# Patient Record
Sex: FEMALE | Race: WHITE | NOT HISPANIC OR LATINO | URBAN - METROPOLITAN AREA
[De-identification: names, ages, dates, MRNs, and addresses within clinical notes are randomized per-mention and may not be internally consistent; named-entity substitution may affect disease eponyms.]

---

## 2019-09-01 ENCOUNTER — EMERGENCY (EMERGENCY)
Facility: HOSPITAL | Age: 84
LOS: 1 days | Discharge: HOME | End: 2019-09-01
Attending: EMERGENCY MEDICINE | Admitting: EMERGENCY MEDICINE
Payer: MEDICARE

## 2019-09-01 VITALS
OXYGEN SATURATION: 98 % | TEMPERATURE: 98 F | WEIGHT: 104.94 LBS | HEART RATE: 81 BPM | DIASTOLIC BLOOD PRESSURE: 76 MMHG | HEIGHT: 64 IN | SYSTOLIC BLOOD PRESSURE: 195 MMHG | RESPIRATION RATE: 16 BRPM

## 2019-09-01 VITALS
TEMPERATURE: 98 F | RESPIRATION RATE: 16 BRPM | HEART RATE: 71 BPM | SYSTOLIC BLOOD PRESSURE: 110 MMHG | OXYGEN SATURATION: 99 % | DIASTOLIC BLOOD PRESSURE: 73 MMHG

## 2019-09-01 PROCEDURE — 99284 EMERGENCY DEPT VISIT MOD MDM: CPT | Mod: 25

## 2019-09-01 PROCEDURE — 71046 X-RAY EXAM CHEST 2 VIEWS: CPT

## 2019-09-01 PROCEDURE — 76700 US EXAM ABDOM COMPLETE: CPT | Mod: 26

## 2019-09-01 PROCEDURE — 93010 ELECTROCARDIOGRAM REPORT: CPT

## 2019-09-01 PROCEDURE — 76700 US EXAM ABDOM COMPLETE: CPT

## 2019-09-01 PROCEDURE — 99284 EMERGENCY DEPT VISIT MOD MDM: CPT

## 2019-09-01 PROCEDURE — 71046 X-RAY EXAM CHEST 2 VIEWS: CPT | Mod: 26

## 2019-09-01 PROCEDURE — 93005 ELECTROCARDIOGRAM TRACING: CPT

## 2019-09-01 RX ORDER — ONDANSETRON 8 MG/1
4 TABLET, FILM COATED ORAL ONCE
Refills: 0 | Status: COMPLETED | OUTPATIENT
Start: 2019-09-01 | End: 2019-09-01

## 2019-09-01 RX ADMIN — ONDANSETRON 4 MILLIGRAM(S): 8 TABLET, FILM COATED ORAL at 19:41

## 2019-09-01 NOTE — ED PROVIDER NOTE - MUSCULOSKELETAL, MLM
Spine appears normal, range of motion is not limited, no muscle or joint tenderness diffuse muscle atrophy

## 2019-09-01 NOTE — ED PROVIDER NOTE - NSFOLLOWUPINSTRUCTIONS_ED_ALL_ED_FT
SOFT FOODS  EAT WITH A MONITOR  SPEECH AND SWALLOW STUDIES AS OUT PT   FOLLOW UP WITH EAR NOSE AND THROAT DR SHAH  RETURN FOR FEVER CHILLS CHEST PAIN SHORTNESS OF BREATH  FOLLOW UP WITH YOUR PRIMARY CARE DOCTOR

## 2019-09-01 NOTE — ED PROVIDER NOTE - EYES, MLM
Clear bilaterally, pupils equal, round and reactive to light. per pt eye lids are retracted abnormally

## 2019-09-01 NOTE — ED PROVIDER NOTE - OBJECTIVE STATEMENT
pt is an 88 yo f who is resident of [facility].  she was eating dinner and began to choke on a piece of steak.  according to transfer note she had an episode of cyanosis of lips no loc pt feels better as the food was dislodged after the hemich was done.  no cp no sob no abd pain.  hx of parkinsons disease on sinemet sp knee surgery pmd dr Bibiana thompson at [facility] never a smoker  bib ems for eval

## 2019-09-01 NOTE — ED PROVIDER NOTE - PATIENT PORTAL LINK FT
You can access the FollowMyHealth Patient Portal offered by [facility] by registering at the following website: [email or web address]. By joining [employer]’s FollowMyHealth portal, you will also be able to view your health information using other applications (apps) compatible with our system.

## 2019-09-01 NOTE — ED ADULT NURSE NOTE - CHPI ED NUR SYMPTOMS NEG
no fever/no tingling/no dizziness/no chills/no decreased eating/drinking/no vomiting/no weakness/no pain

## 2019-09-01 NOTE — ED PROVIDER NOTE - CONSTITUTIONAL, MLM
normal... chronically ill w f thin with parkinsonian tremor, well nourished, awake, alert, oriented to person, place, time/situation and in no apparent distress.

## 2019-09-01 NOTE — ED ADULT NURSE NOTE - OBJECTIVE STATEMENT
87 year old female brought in by EMS from [facility] post choking on a piece of steak. Patient was eating at the facility, began choking and presented with cyanotic lips/face. The Heimlich was performed on the patient and the food was dislodged. Patient denies loss of consciousness. Patient reports immediate relief once the Heimlich was completed. On arrival to the ED patient only complains of nausea without vomiting. Patient is at her baseline mental status. Patient otherwise feels her normal self. Patient is without cyanosis to the face or mouth or lips. Patient denies shortness of breath or difficulty breathing. No respiratory distress noted. Patient denies pain. Denies chest or abdominal pain. Patient with history of Parkinson's, mild tremor noted.

## 2019-09-21 ENCOUNTER — INPATIENT (INPATIENT)
Facility: HOSPITAL | Age: 84
LOS: 2 days | Discharge: HOME | DRG: 392 | End: 2019-09-24
Attending: INTERNAL MEDICINE | Admitting: HOSPITALIST
Payer: MEDICARE

## 2019-09-21 VITALS
DIASTOLIC BLOOD PRESSURE: 84 MMHG | TEMPERATURE: 98 F | SYSTOLIC BLOOD PRESSURE: 118 MMHG | RESPIRATION RATE: 16 BRPM | WEIGHT: 95.02 LBS | OXYGEN SATURATION: 99 % | HEIGHT: 60 IN | HEART RATE: 74 BPM

## 2019-09-21 DIAGNOSIS — K52.9 NONINFECTIVE GASTROENTERITIS AND COLITIS, UNSPECIFIED: ICD-10-CM

## 2019-09-21 DIAGNOSIS — R33.9 RETENTION OF URINE, UNSPECIFIED: ICD-10-CM

## 2019-09-21 DIAGNOSIS — E78.5 HYPERLIPIDEMIA, UNSPECIFIED: ICD-10-CM

## 2019-09-21 DIAGNOSIS — G20 PARKINSON'S DISEASE: ICD-10-CM

## 2019-09-21 DIAGNOSIS — Z29.9 ENCOUNTER FOR PROPHYLACTIC MEASURES, UNSPECIFIED: ICD-10-CM

## 2019-09-21 LAB
ALBUMIN SERPL ELPH-MCNC: 3.9 G/DL — SIGNIFICANT CHANGE UP (ref 3.3–5)
ALP SERPL-CCNC: 96 U/L — SIGNIFICANT CHANGE UP (ref 40–120)
ALT FLD-CCNC: <5 U/L — LOW (ref 10–45)
ANION GAP SERPL CALC-SCNC: 9 MMOL/L — SIGNIFICANT CHANGE UP (ref 5–17)
APPEARANCE UR: CLEAR — SIGNIFICANT CHANGE UP
APTT BLD: 32.3 SEC — SIGNIFICANT CHANGE UP (ref 27.5–36.3)
AST SERPL-CCNC: 11 U/L — SIGNIFICANT CHANGE UP (ref 10–40)
BACTERIA # UR AUTO: NEGATIVE — SIGNIFICANT CHANGE UP
BASOPHILS # BLD AUTO: 0 K/UL — SIGNIFICANT CHANGE UP (ref 0–0.2)
BASOPHILS NFR BLD AUTO: 0.2 % — SIGNIFICANT CHANGE UP (ref 0–2)
BILIRUB SERPL-MCNC: 0.4 MG/DL — SIGNIFICANT CHANGE UP (ref 0.2–1.2)
BILIRUB UR-MCNC: NEGATIVE — SIGNIFICANT CHANGE UP
BUN SERPL-MCNC: 26 MG/DL — HIGH (ref 7–23)
CALCIUM SERPL-MCNC: 10 MG/DL — SIGNIFICANT CHANGE UP (ref 8.4–10.5)
CHLORIDE SERPL-SCNC: 101 MMOL/L — SIGNIFICANT CHANGE UP (ref 96–108)
CO2 SERPL-SCNC: 30 MMOL/L — SIGNIFICANT CHANGE UP (ref 22–31)
COLOR SPEC: YELLOW — SIGNIFICANT CHANGE UP
CREAT SERPL-MCNC: 0.5 MG/DL — SIGNIFICANT CHANGE UP (ref 0.5–1.3)
DIFF PNL FLD: NEGATIVE — SIGNIFICANT CHANGE UP
EOSINOPHIL # BLD AUTO: 0 K/UL — SIGNIFICANT CHANGE UP (ref 0–0.5)
EOSINOPHIL NFR BLD AUTO: 0.3 % — SIGNIFICANT CHANGE UP (ref 0–6)
EPI CELLS # UR: 1 /HPF — SIGNIFICANT CHANGE UP
GLUCOSE SERPL-MCNC: 108 MG/DL — HIGH (ref 70–99)
GLUCOSE UR QL: NEGATIVE — SIGNIFICANT CHANGE UP
HCT VFR BLD CALC: 39.8 % — SIGNIFICANT CHANGE UP (ref 34.5–45)
HGB BLD-MCNC: 12.4 G/DL — SIGNIFICANT CHANGE UP (ref 11.5–15.5)
HYALINE CASTS # UR AUTO: 2 /LPF — SIGNIFICANT CHANGE UP (ref 0–2)
INR BLD: 1.08 RATIO — SIGNIFICANT CHANGE UP (ref 0.88–1.16)
KETONES UR-MCNC: SIGNIFICANT CHANGE UP
LEUKOCYTE ESTERASE UR-ACNC: NEGATIVE — SIGNIFICANT CHANGE UP
LYMPHOCYTES # BLD AUTO: 0.8 K/UL — LOW (ref 1–3.3)
LYMPHOCYTES # BLD AUTO: 6.2 % — LOW (ref 13–44)
MCHC RBC-ENTMCNC: 27.1 PG — SIGNIFICANT CHANGE UP (ref 27–34)
MCHC RBC-ENTMCNC: 31.1 GM/DL — LOW (ref 32–36)
MCV RBC AUTO: 87.4 FL — SIGNIFICANT CHANGE UP (ref 80–100)
MONOCYTES # BLD AUTO: 0.5 K/UL — SIGNIFICANT CHANGE UP (ref 0–0.9)
MONOCYTES NFR BLD AUTO: 4.4 % — SIGNIFICANT CHANGE UP (ref 2–14)
NEUTROPHILS # BLD AUTO: 11.1 K/UL — HIGH (ref 1.8–7.4)
NEUTROPHILS NFR BLD AUTO: 88.9 % — HIGH (ref 43–77)
NITRITE UR-MCNC: NEGATIVE — SIGNIFICANT CHANGE UP
PH UR: 6.5 — SIGNIFICANT CHANGE UP (ref 5–8)
PLATELET # BLD AUTO: 329 K/UL — SIGNIFICANT CHANGE UP (ref 150–400)
POTASSIUM SERPL-MCNC: 4 MMOL/L — SIGNIFICANT CHANGE UP (ref 3.5–5.3)
POTASSIUM SERPL-SCNC: 4 MMOL/L — SIGNIFICANT CHANGE UP (ref 3.5–5.3)
PROT SERPL-MCNC: 6.1 G/DL — SIGNIFICANT CHANGE UP (ref 6–8.3)
PROT UR-MCNC: ABNORMAL
PROTHROM AB SERPL-ACNC: 12.3 SEC — SIGNIFICANT CHANGE UP (ref 10–12.9)
RBC # BLD: 4.55 M/UL — SIGNIFICANT CHANGE UP (ref 3.8–5.2)
RBC # FLD: 13.5 % — SIGNIFICANT CHANGE UP (ref 10.3–14.5)
RBC CASTS # UR COMP ASSIST: 2 /HPF — SIGNIFICANT CHANGE UP (ref 0–4)
SODIUM SERPL-SCNC: 140 MMOL/L — SIGNIFICANT CHANGE UP (ref 135–145)
SP GR SPEC: 1.02 — SIGNIFICANT CHANGE UP (ref 1.01–1.02)
UROBILINOGEN FLD QL: NEGATIVE — SIGNIFICANT CHANGE UP
WBC # BLD: 12.5 K/UL — HIGH (ref 3.8–10.5)
WBC # FLD AUTO: 12.5 K/UL — HIGH (ref 3.8–10.5)
WBC UR QL: 1 /HPF — SIGNIFICANT CHANGE UP (ref 0–5)

## 2019-09-21 PROCEDURE — 99285 EMERGENCY DEPT VISIT HI MDM: CPT

## 2019-09-21 PROCEDURE — 74177 CT ABD & PELVIS W/CONTRAST: CPT | Mod: 26

## 2019-09-21 PROCEDURE — 99223 1ST HOSP IP/OBS HIGH 75: CPT | Mod: GC

## 2019-09-21 RX ORDER — CARBIDOPA AND LEVODOPA 25; 100 MG/1; MG/1
1 TABLET ORAL ONCE
Refills: 0 | Status: COMPLETED | OUTPATIENT
Start: 2019-09-21 | End: 2019-09-21

## 2019-09-21 RX ORDER — ACETAMINOPHEN 500 MG
975 TABLET ORAL ONCE
Refills: 0 | Status: COMPLETED | OUTPATIENT
Start: 2019-09-21 | End: 2019-09-21

## 2019-09-21 RX ORDER — METRONIDAZOLE 500 MG
500 TABLET ORAL ONCE
Refills: 0 | Status: COMPLETED | OUTPATIENT
Start: 2019-09-21 | End: 2019-09-21

## 2019-09-21 RX ORDER — MIRTAZAPINE 45 MG/1
15 TABLET, ORALLY DISINTEGRATING ORAL ONCE
Refills: 0 | Status: COMPLETED | OUTPATIENT
Start: 2019-09-21 | End: 2019-09-21

## 2019-09-21 RX ORDER — SENNA PLUS 8.6 MG/1
2 TABLET ORAL AT BEDTIME
Refills: 0 | Status: DISCONTINUED | OUTPATIENT
Start: 2019-09-21 | End: 2019-09-22

## 2019-09-21 RX ORDER — SODIUM CHLORIDE 9 MG/ML
1000 INJECTION INTRAMUSCULAR; INTRAVENOUS; SUBCUTANEOUS ONCE
Refills: 0 | Status: COMPLETED | OUTPATIENT
Start: 2019-09-21 | End: 2019-09-21

## 2019-09-21 RX ORDER — DOCUSATE SODIUM 100 MG
100 CAPSULE ORAL THREE TIMES A DAY
Refills: 0 | Status: DISCONTINUED | OUTPATIENT
Start: 2019-09-21 | End: 2019-09-22

## 2019-09-21 RX ORDER — CARBIDOPA AND LEVODOPA 25; 100 MG/1; MG/1
1 TABLET ORAL ONCE
Refills: 0 | Status: DISCONTINUED | OUTPATIENT
Start: 2019-09-21 | End: 2019-09-21

## 2019-09-21 RX ORDER — CIPROFLOXACIN LACTATE 400MG/40ML
400 VIAL (ML) INTRAVENOUS ONCE
Refills: 0 | Status: COMPLETED | OUTPATIENT
Start: 2019-09-21 | End: 2019-09-21

## 2019-09-21 RX ADMIN — CARBIDOPA AND LEVODOPA 1 TABLET(S): 25; 100 TABLET ORAL at 17:37

## 2019-09-21 RX ADMIN — CARBIDOPA AND LEVODOPA 1 TABLET(S): 25; 100 TABLET ORAL at 17:38

## 2019-09-21 RX ADMIN — CARBIDOPA AND LEVODOPA 1 TABLET(S): 25; 100 TABLET ORAL at 22:15

## 2019-09-21 RX ADMIN — Medication 200 MILLIGRAM(S): at 21:06

## 2019-09-21 RX ADMIN — SODIUM CHLORIDE 1000 MILLILITER(S): 9 INJECTION INTRAMUSCULAR; INTRAVENOUS; SUBCUTANEOUS at 21:06

## 2019-09-21 RX ADMIN — MIRTAZAPINE 15 MILLIGRAM(S): 45 TABLET, ORALLY DISINTEGRATING ORAL at 22:15

## 2019-09-21 RX ADMIN — Medication 1 ENEMA: at 16:12

## 2019-09-21 RX ADMIN — Medication 975 MILLIGRAM(S): at 21:10

## 2019-09-21 RX ADMIN — Medication 100 MILLIGRAM(S): at 22:16

## 2019-09-21 NOTE — H&P ADULT - HISTORY OF PRESENT ILLNESS
Pt is a 86 yo F w/ Parkinson's disease, HLD p/w constipation for about 6 days. Pt reported that she has been unable to "defecate" for about 6 day. Pt reported that on the day of admission she additionally had trouble urinating and had pain in her anus which she described  though she josh as though "i have to go, but can't." Pt reported that she did not experience and fevers, chills, N/V, SOB, CP, abdominal pain, melena, hematochezia.     In the ED,  T(C): 36.4 (21 Sep 2019 19:31), Max: 36.6 (21 Sep 2019 14:01)  T(F): 97.5 (21 Sep 2019 19:31), Max: 97.9 (21 Sep 2019 14:01)  HR: 77 (21 Sep 2019 21:42) (74 - 87)  BP: 124/78 (21 Sep 2019 21:42) (118/84 - 150/118)  RR: 16 (21 Sep 2019 21:42) (16 - 18)  SpO2: 98% (21 Sep 2019 21:42) (97% - 99%)    WBC: 12.5     CT A/P: Moderate colonic fecal load including a large amount of stool within the   rectum which demonstrates wall thickening and surrounding inflammatory   change, suggestive of stercoral colitis. No evidence of small bowel obstruction.  Small nonspecific pelvic and perihepatic free fluid. Mild periportal edema, nonspecific.    Pt was treated w/ Carbidopa/levodopa 25/100mg PO x1, carbidopa/levodopa CR 25/100 PO x1, Cipro/Flagyl, mirtazapine 15mg PO x1, Fleet Enema x1, and 1L NS bolus x1.

## 2019-09-21 NOTE — ED PROVIDER NOTE - PROGRESS NOTE DETAILS
Patient takes both Sinemet 25/100 and Sinemet ER 25/100 at 830am, 1130am, 230pm, 600pm, 900pm. Patient also takes Remeron 15mg at 900pm. - Mariama Wakefield PA-C [phone] Daughter's phone number for further contact. - PALMA MccoyC Pt complaining of inability to urinate, saying she is pushing but nothing is coming out, suprapubic area flat to percussion and tender, can palpate the bladder and it is enlarged, will place Aleman for comfort and possible retention.  Hafsa

## 2019-09-21 NOTE — ED PROVIDER NOTE - PHYSICAL EXAMINATION
GEN: Well Appearing, Nontoxic, NAD  HEENT: NC/AT, Symm Facies. EOMI  CV:  +S1S2, RRR w/o m/g/r  RESP: CTAB w/o w/r/r  ABD: Soft, nt/nd, +BS. No guarding/rebound.  EXT/MSK: No lower extremity edema or calf tenderness.  FROMx4  SKIN: No erythema, lesions or rash  Neuro: Grossly intact, AOX3 with normal speech, Sensation intact, motor equal GEN: Well Appearing, Nontoxic, NAD  HEENT: NC/AT, Symm Facies. EOMI  CV:  +S1S2, RRR w/o m/g/r  RESP: CTAB w/o w/r/r  ABD: Soft, nt/nd, +BS. No guarding/rebound.  EXT/MSK: No lower extremity edema or calf tenderness.  FROMx4  SKIN: No erythema, lesions or rash  Neuro: Grossly intact, AOX3 with normal speech, Sensation intact, motor equal  Rectal: Loose stool felt, no hard masses palpated, non tender

## 2019-09-21 NOTE — H&P ADULT - PROBLEM SELECTOR PLAN 2
Pt c/o dysuria x1 day. Likely in the setting chronic constipation.   - Management as above  - Strict I/Os  - Monitor for Pt c/o dysuria x1 day in setting of large stool burden.   - Management as above  - Strict I/Os  - Will obtain PVR. If patient is retaining urine will place mayo. Pt c/o dysuria x1 day in setting of large stool burden.   - Management as above  - Strict I/Os  - Will obtain PVR. If patient is retaining urine, will place mayo.

## 2019-09-21 NOTE — H&P ADULT - PROBLEM SELECTOR PLAN 5
DVT: SCDs  Diet: Regular  Code: Full  Dispo: Back to assisted living facility. Home carbidopa-levodopa needs to be clarified. Documentation from assisted living facility reported sinemet and sinemet CR dosing frequency as 5 times a day.

## 2019-09-21 NOTE — ED PROVIDER NOTE - NS ED ROS FT
Constitutional: No fever or chills  Eyes: No visual changes  CV: No chest pain or lower extremity edema  Resp: No SOB no cough  GI: No abd pain. No nausea or vomiting. +constipation.   : +unable to urinate since AM, No dysuria, hematuria.   MSK: No musculoskeletal pain  Skin: No rash  Psych: No complaints   Neuro: No headache. No numbness or tingling. No weakness.

## 2019-09-21 NOTE — ED PROVIDER NOTE - OBJECTIVE STATEMENT
86 y/o female with PMH Parkinson's presents to ED from [facility] with 5 days without BM, although patient states that she passed two small, hard pieces of stool about 1 hour ago. Patient states that she has not been able to urinate today, last time was last night. States she has been tolerating PO intake, +Flatus.   Denies abdominal pain, SOB, chest pain, nausea, vomiting, fever, hematuria, dysuria, increased urinary frequency.   States that she drank her "ballerina tea" which has helped her in the past defecate although has not worked.

## 2019-09-21 NOTE — H&P ADULT - PROBLEM SELECTOR PLAN 4
- C/w home Carbidopa-Levodopa DVT: SCDs  Diet: Regular  Code: Full  Dispo: Back to assisted living facility.

## 2019-09-21 NOTE — ED PROVIDER NOTE - ATTENDING CONTRIBUTION TO CARE
parkinsons , 5 days constip / no pain  non-tender abdomen  pa exam w some stool in vault -not impacted - removed  enema / Symptomatic treatment. consider ct.  pt wo bm / ct ordered  ct shows colitis / will admit.

## 2019-09-21 NOTE — ED ADULT NURSE NOTE - NSIMPLEMENTINTERV_GEN_ALL_ED
Implemented All Fall Risk Interventions:  [place] to call system. Call bell, personal items and telephone within reach. Instruct patient to call for assistance. Room bathroom lighting operational. Non-slip footwear when patient is off stretcher. Physically safe environment: no spills, clutter or unnecessary equipment. Stretcher in lowest position, wheels locked, appropriate side rails in place. Provide visual cue, wrist band, yellow gown, etc. Monitor gait and stability. Monitor for mental status changes and reorient to person, place, and time. Review medications for side effects contributing to fall risk. Reinforce activity limits and safety measures with patient and family.

## 2019-09-21 NOTE — ED ADULT NURSE NOTE - OBJECTIVE STATEMENT
86 y/o female PMH Parkinson's disease presents to ED reporting abdominal pain via EMS. EMS reports pt has not had a bowel movement in 5 days, most recently urinated last night and 88 y/o female PMH Parkinson's disease presents to ED reporting abdominal pain via EMS. EMS reports pt has not had a bowel movement in 5 days, most recently urinated last night and has not urinated since. On exam, AOx3, speaking in complete sentences. Lung sounds CTA, NAD. Abdomen soft, non-tender, mildly distended. Pt denies CP, SOB, n/v/d, fever/chills at this time. Seen and evaluated by PA. Jr placed, labs sent.

## 2019-09-21 NOTE — H&P ADULT - NSHPPHYSICALEXAM_GEN_ALL_CORE
T(C): 36.4 (09-21-19 @ 19:31), Max: 36.6 (09-21-19 @ 14:01)  HR: 77 (09-21-19 @ 21:42) (74 - 87)  BP: 124/78 (09-21-19 @ 21:42) (118/84 - 150/118)  RR: 16 (09-21-19 @ 21:42) (16 - 18)  SpO2: 98% (09-21-19 @ 21:42) (97% - 99%)    GENERAL APPEARANCE: NAD; frail.   HEENT:  PERRL, EOMI. External auditory canals normal, hearing grossly intact.  NECK: Neck supple, non-tender without lymphadenopathy, masses or thyromegaly.  CARDIAC: Normal S1 and S2. No S3, S4 or murmurs. Rhythm is regular.  LUNGS: mild bibasilar crackles.   ABDOMEN: Positive bowel sounds. Soft, nondistended, nontender. No guarding or rebound.   EXTREMITIES: No significant deformity or joint abnormality. No edema. Peripheral pulses intact.   NEUROLOGICAL: CN II-XII intact. Strength and sensation symmetric and intact throughout. Course resting tremor  SKIN: Skin normal color, texture and turgor with no lesions or eruptions.  PSYCHIATRIC: AOx3.Normal affect and behavior. T(C): 36.4 (09-21-19 @ 19:31), Max: 36.6 (09-21-19 @ 14:01)  HR: 77 (09-21-19 @ 21:42) (74 - 87)  BP: 124/78 (09-21-19 @ 21:42) (118/84 - 150/118)  RR: 16 (09-21-19 @ 21:42) (16 - 18)  SpO2: 98% (09-21-19 @ 21:42) (97% - 99%)    GENERAL APPEARANCE: NAD; frail.   HEENT:  PERRL, EOMI. External auditory canals normal, hearing grossly intact.  NECK: Neck supple, non-tender without lymphadenopathy, masses or thyromegaly.  CARDIAC: Normal S1 and S2. No S3, S4 or murmurs. Rhythm is regular.  LUNGS: mild bibasilar crackles.   ABDOMEN: Positive bowel sounds. Soft, nondistended, nontender. No guarding or rebound.   EXTREMITIES: No significant deformity or joint abnormality. No edema. Peripheral pulses intact.   Rectal: Soiled w/ moderate amount of stool. External hemorrhoids; rectal vault filled w/ soft stool.   NEUROLOGICAL: CN II-XII intact. Strength and sensation symmetric and intact throughout. Course resting tremor  SKIN: Skin normal color, texture and turgor with no lesions or eruptions.  PSYCHIATRIC: AOx3.Normal affect and behavior. T(C): 36.4 (09-21-19 @ 19:31), Max: 36.6 (09-21-19 @ 14:01)  HR: 77 (09-21-19 @ 21:42) (74 - 87)  BP: 124/78 (09-21-19 @ 21:42) (118/84 - 150/118)  RR: 16 (09-21-19 @ 21:42) (16 - 18)  SpO2: 98% (09-21-19 @ 21:42) (97% - 99%)    GENERAL APPEARANCE: NAD; frail.   HEENT:  PERRL, EOMI. External auditory canals normal, hearing grossly intact.  NECK: Neck supple, non-tender without lymphadenopathy, masses or thyromegaly.  Heart: Normal S1 and S2. No S3, S4 or murmurs. Rhythm is regular. no LE edema.   LUNGS: mild bibasilar crackles. no wheeze no resp distrss or accessory muscle usage  ABDOMEN: Positive bowel sounds. Soft, nondistended, nontender. No guarding or rebound.   EXTREMITIES: No significant deformity or joint abnormality. No edema. Peripheral pulses intact.   Rectal: Soiled w/ moderate amount of stool. External hemorrhoids; rectal vault filled w/ soft stool.   NEUROLOGICAL: CN II-XII intact. Strength and sensation symmetric and intact throughout. Course resting tremor  SKIN: Skin normal color, texture and turgor with no lesions or eruptions.  PSYCHIATRIC: AOx3.Normal affect and behavior.

## 2019-09-21 NOTE — H&P ADULT - NSHPREVIEWOFSYSTEMS_GEN_ALL_CORE
CONSTITUTIONAL:  No weight loss, fever, chills, weakness or fatigue.  HEENT:  Eyes:  No visual loss, blurred vision, double vision or yellow sclerae. Ears, Nose, Throat:  No hearing loss, sneezing, congestion, runny nose or sore throat.  SKIN:  No rash or itching.  CARDIOVASCULAR:  No chest pain, chest pressure or chest discomfort. No palpitations.  RESPIRATORY:  No shortness of breath, cough or sputum.  GASTROINTESTINAL:  See HPI  GENITOURINARY:  See HPI  NEUROLOGICAL:  No headache, dizziness, syncope, paralysis, ataxia, numbness or tingling in the extremities. No change in bowel or bladder control.  MUSCULOSKELETAL:  No muscle, back pain, joint pain or stiffness.  HEMATOLOGIC:  No, bleeding or bruising.  LYMPHATICS:  No enlarged nodes.   PSYCHIATRIC:  No history of depression or anxiety.  ENDOCRINOLOGIC:  No reports of sweating, cold or heat intolerance. No polyuria or polydipsia.  ALLERGIES:  No history of asthma, hives, eczema or rhinitis.

## 2019-09-21 NOTE — H&P ADULT - PROBLEM SELECTOR PLAN 1
Pt p/w constipation for about 6 days w/ evidence of stercoral colitis on CT imaging. S/p Cipro/Flagyl in the ED  - C/w Cipro/flagyl for empiric infectious treatment  - Initiate bowel regimen  - Consider SMOG enema, if patient unable to pass adequate stool Pt p/w constipation for about 6 days w/ evidence of stercoral colitis on CT imaging. S/p Cipro/Flagyl in the ED  - C/w Cipro/flagyl for empiric infectious treatment (Day 1/5)  - Initiate bowel regimen  - Consider SMOG enema, if patient unable to pass adequate stool Pt p/w constipation for about 6 days w/ evidence of stercoral colitis on CT imaging. S/p Cipro/Flagyl in the ED  - Monitor off abx. Leukocytosis liekly reactive.  - Check lactate in the AM  - Initiate bowel regimen  - Consider SMOG enema, if patient unable to pass adequate stool Pt p/w constipation for about 6 days w/ evidence of stercoral colitis on CT imaging. S/p Cipro/Flagyl in the ED  - Monitor off abx. Leukocytosis liekly reactive. afebrile.   - Check lactate in the AM  - Initiate bowel regimen  - Consider SMOG enema, if patient unable to pass adequate stool  -d/w RN, pt had one large volume BM with few scant volume. distention improved, urinary retention improved, rectal pain improved.

## 2019-09-21 NOTE — H&P ADULT - PROBLEM SELECTOR PLAN 3
- C/w home statin - C/w home Carbidopa-Levodopa - C/w home Carbidopa-Levodopa  -pt states she takes 5 tabs or standard release + 5 tabs ER sinemet daily; will defer to day team to call pharmacy to confirm

## 2019-09-21 NOTE — ED ADULT NURSE REASSESSMENT NOTE - NS ED NURSE REASSESS COMMENT FT1
report received from day RN Simin BOWEN. Pt found resting in semi-garner position upon return from CT; non-labored respirations. VS documented. Pt c/o mild abdominal discomfort; states she is "due at 8pm for the Parkinson's medications." PEDRO Leslie aware. Pt left in position of comfort, guard rails up, bed low and locked. Will reassess

## 2019-09-21 NOTE — H&P ADULT - NSHPLABSRESULTS_GEN_ALL_CORE
12.4   12.5  )-----------( 329      ( 21 Sep 2019 14:52 )             39.8     Auto Eosinophil # 0.0   / Auto Eosinophil % 0.3   / Auto Neutrophil # 11.1  / Auto Neutrophil % 88.9  / BANDS % x            140  |  101  |  26<H>  ----------------------------<  108<H>  4.0   |  30  |  0.50    Ca    10.0      21 Sep 2019 14:52  TPro  6.1  /  Alb  3.9  /  TBili  0.4  /  DBili  x   /  AST  11  /  ALT  <5<L>  /  AlkPhos  96      PT/INR - ( 21 Sep 2019 14:52 )   PT: 12.3 sec;   INR: 1.08 ratio         PTT - ( 21 Sep 2019 14:52 )  PTT:32.3 sec      Urinalysis Basic - ( 21 Sep 2019 15:54 )    Color: Yellow / Appearance: Clear / S.023 / pH: x  Gluc: x / Ketone: Trace  / Bili: Negative / Urobili: Negative   Blood: x / Protein: Trace / Nitrite: Negative   Leuk Esterase: Negative / RBC: 2 /hpf / WBC 1 /HPF   Sq Epi: x / Non Sq Epi: 1 /hpf / Bacteria: Negative      < from: CT Abdomen and Pelvis w/ IV Cont (19 @ 19:06) >    PROCEDURE:   CT of the Abdomen and Pelvis was performed with intravenous contrast.   Intravenous contrast: 90 ml Omnipaque 350. 10 ml discarded.  Oral contrast: None.  Sagittal and coronal reformats were performed.    FINDINGS:    LOWER CHEST: Right basilar subsegmental atelectasis    LIVER: Mild periportal edema, nonspecific.  BILE DUCTS: Normal caliber.  GALLBLADDER: Within normal limits.  SPLEEN: Within normal limits.i  PANCREAS: Within normal limits.  ADRENALS: Within normal limits.  KIDNEYS/URETERS: Bilateral low-attenuation lesions too small to   accurately characterize. No hydronephrosis. Symmetric parenchymal   enhancement.    BLADDER: Within normal limits.  REPRODUCTIVE ORGANS: Calcified fibroid uterus.    BOWEL: No bowel obstruction. Moderate colonic fecal load including a   large amount of stool within the rectum. Associated rectal wall   thickening and perirectal inflammatory change, concerning for stercoral   colitis. No evidence of small bowel obstruction.  PERITONEUM: Small pelvic and trace perihepatic free fluid.  VESSELS: Atherosclerosis of the abdominal aorta and its branch vessels.  RETROPERITONEUM/LYMPH NODES: No lymphadenopathy.    ABDOMINAL WALL: Within normal limits.  BONES: Multilevel degenerative changes of the spine.    IMPRESSION:     Moderate colonic fecal load including a large amount of stool within the   rectum which demonstrates wall thickening and surrounding inflammatory   change, suggestive of stercoral colitis.    No evidence of small bowel obstruction.    Small nonspecific pelvic and perihepatic free fluid.    Mild periportal edema, nonspecific.    < end of copied text > 12.4   12.5  )-----------( 329      ( 21 Sep 2019 14:52 )             39.8     Auto Eosinophil # 0.0   / Auto Eosinophil % 0.3   / Auto Neutrophil # 11.1  / Auto Neutrophil % 88.9  / BANDS % x            140  |  101  |  26<H>  ----------------------------<  108<H>  4.0   |  30  |  0.50    Ca    10.0      21 Sep 2019 14:52  TPro  6.1  /  Alb  3.9  /  TBili  0.4  /  DBili  x   /  AST  11  /  ALT  <5<L>  /  AlkPhos  96      PT/INR - ( 21 Sep 2019 14:52 )   PT: 12.3 sec;   INR: 1.08 ratio         PTT - ( 21 Sep 2019 14:52 )  PTT:32.3 sec      Urinalysis Basic - ( 21 Sep 2019 15:54 )    Color: Yellow / Appearance: Clear / S.023 / pH: x  Gluc: x / Ketone: Trace  / Bili: Negative / Urobili: Negative   Blood: x / Protein: Trace / Nitrite: Negative   Leuk Esterase: Negative / RBC: 2 /hpf / WBC 1 /HPF   Sq Epi: x / Non Sq Epi: 1 /hpf / Bacteria: Negative      < from: CT Abdomen and Pelvis w/ IV Cont (19 @ 19:06) >    PROCEDURE:   CT of the Abdomen and Pelvis was performed with intravenous contrast.   Intravenous contrast: 90 ml Omnipaque 350. 10 ml discarded.  Oral contrast: None.  Sagittal and coronal reformats were performed.    FINDINGS:    LOWER CHEST: Right basilar subsegmental atelectasis    LIVER: Mild periportal edema, nonspecific.  BILE DUCTS: Normal caliber.  GALLBLADDER: Within normal limits.  SPLEEN: Within normal limits.i  PANCREAS: Within normal limits.  ADRENALS: Within normal limits.  KIDNEYS/URETERS: Bilateral low-attenuation lesions too small to   accurately characterize. No hydronephrosis. Symmetric parenchymal   enhancement.    BLADDER: Within normal limits.  REPRODUCTIVE ORGANS: Calcified fibroid uterus.    BOWEL: No bowel obstruction. Moderate colonic fecal load including a   large amount of stool within the rectum. Associated rectal wall   thickening and perirectal inflammatory change, concerning for stercoral   colitis. No evidence of small bowel obstruction.  PERITONEUM: Small pelvic and trace perihepatic free fluid.  VESSELS: Atherosclerosis of the abdominal aorta and its branch vessels.  RETROPERITONEUM/LYMPH NODES: No lymphadenopathy.    ABDOMINAL WALL: Within normal limits.  BONES: Multilevel degenerative changes of the spine.    IMPRESSION:     Moderate colonic fecal load including a large amount of stool within the   rectum which demonstrates wall thickening and surrounding inflammatory   change, suggestive of stercoral colitis.    No evidence of small bowel obstruction.    Small nonspecific pelvic and perihepatic free fluid.    Mild periportal edema, nonspecific.    < end of copied text >    Labs, imaging, ekg personally reviewed

## 2019-09-22 ENCOUNTER — TRANSCRIPTION ENCOUNTER (OUTPATIENT)
Age: 84
End: 2019-09-22

## 2019-09-22 DIAGNOSIS — Z79.899 OTHER LONG TERM (CURRENT) DRUG THERAPY: ICD-10-CM

## 2019-09-22 LAB
ALBUMIN SERPL ELPH-MCNC: 3.5 G/DL — SIGNIFICANT CHANGE UP (ref 3.3–5)
ALP SERPL-CCNC: 88 U/L — SIGNIFICANT CHANGE UP (ref 40–120)
ALT FLD-CCNC: <5 U/L — LOW (ref 10–45)
ANION GAP SERPL CALC-SCNC: 10 MMOL/L — SIGNIFICANT CHANGE UP (ref 5–17)
AST SERPL-CCNC: 9 U/L — LOW (ref 10–40)
BASOPHILS # BLD AUTO: 0.04 K/UL — SIGNIFICANT CHANGE UP (ref 0–0.2)
BASOPHILS NFR BLD AUTO: 0.4 % — SIGNIFICANT CHANGE UP (ref 0–2)
BILIRUB SERPL-MCNC: 0.9 MG/DL — SIGNIFICANT CHANGE UP (ref 0.2–1.2)
BUN SERPL-MCNC: 17 MG/DL — SIGNIFICANT CHANGE UP (ref 7–23)
CALCIUM SERPL-MCNC: 9.7 MG/DL — SIGNIFICANT CHANGE UP (ref 8.4–10.5)
CHLORIDE SERPL-SCNC: 102 MMOL/L — SIGNIFICANT CHANGE UP (ref 96–108)
CO2 SERPL-SCNC: 31 MMOL/L — SIGNIFICANT CHANGE UP (ref 22–31)
CREAT SERPL-MCNC: 0.67 MG/DL — SIGNIFICANT CHANGE UP (ref 0.5–1.3)
CULTURE RESULTS: SIGNIFICANT CHANGE UP
EOSINOPHIL # BLD AUTO: 0.02 K/UL — SIGNIFICANT CHANGE UP (ref 0–0.5)
EOSINOPHIL NFR BLD AUTO: 0.2 % — SIGNIFICANT CHANGE UP (ref 0–6)
GLUCOSE SERPL-MCNC: 91 MG/DL — SIGNIFICANT CHANGE UP (ref 70–99)
HCT VFR BLD CALC: 38.7 % — SIGNIFICANT CHANGE UP (ref 34.5–45)
HGB BLD-MCNC: 12.3 G/DL — SIGNIFICANT CHANGE UP (ref 11.5–15.5)
IMM GRANULOCYTES NFR BLD AUTO: 0.2 % — SIGNIFICANT CHANGE UP (ref 0–1.5)
LACTATE SERPL-SCNC: 1.2 MMOL/L — SIGNIFICANT CHANGE UP (ref 0.7–2)
LYMPHOCYTES # BLD AUTO: 0.61 K/UL — LOW (ref 1–3.3)
LYMPHOCYTES # BLD AUTO: 5.9 % — LOW (ref 13–44)
MAGNESIUM SERPL-MCNC: 2 MG/DL — SIGNIFICANT CHANGE UP (ref 1.6–2.6)
MCHC RBC-ENTMCNC: 27.2 PG — SIGNIFICANT CHANGE UP (ref 27–34)
MCHC RBC-ENTMCNC: 31.8 GM/DL — LOW (ref 32–36)
MCV RBC AUTO: 85.4 FL — SIGNIFICANT CHANGE UP (ref 80–100)
MONOCYTES # BLD AUTO: 0.7 K/UL — SIGNIFICANT CHANGE UP (ref 0–0.9)
MONOCYTES NFR BLD AUTO: 6.8 % — SIGNIFICANT CHANGE UP (ref 2–14)
NEUTROPHILS # BLD AUTO: 8.98 K/UL — HIGH (ref 1.8–7.4)
NEUTROPHILS NFR BLD AUTO: 86.5 % — HIGH (ref 43–77)
PHOSPHATE SERPL-MCNC: 3.5 MG/DL — SIGNIFICANT CHANGE UP (ref 2.5–4.5)
PLATELET # BLD AUTO: 319 K/UL — SIGNIFICANT CHANGE UP (ref 150–400)
POTASSIUM SERPL-MCNC: 3.9 MMOL/L — SIGNIFICANT CHANGE UP (ref 3.5–5.3)
POTASSIUM SERPL-SCNC: 3.9 MMOL/L — SIGNIFICANT CHANGE UP (ref 3.5–5.3)
PROT SERPL-MCNC: 5.5 G/DL — LOW (ref 6–8.3)
RBC # BLD: 4.53 M/UL — SIGNIFICANT CHANGE UP (ref 3.8–5.2)
RBC # FLD: 14.9 % — HIGH (ref 10.3–14.5)
SODIUM SERPL-SCNC: 143 MMOL/L — SIGNIFICANT CHANGE UP (ref 135–145)
SPECIMEN SOURCE: SIGNIFICANT CHANGE UP
WBC # BLD: 10.37 K/UL — SIGNIFICANT CHANGE UP (ref 3.8–10.5)
WBC # FLD AUTO: 10.37 K/UL — SIGNIFICANT CHANGE UP (ref 3.8–10.5)

## 2019-09-22 PROCEDURE — 74018 RADEX ABDOMEN 1 VIEW: CPT | Mod: 26

## 2019-09-22 RX ORDER — POLYETHYLENE GLYCOL 3350 17 G/17G
17 POWDER, FOR SOLUTION ORAL DAILY
Refills: 0 | Status: DISCONTINUED | OUTPATIENT
Start: 2019-09-22 | End: 2019-09-24

## 2019-09-22 RX ORDER — SODIUM CHLORIDE 9 MG/ML
1000 INJECTION INTRAMUSCULAR; INTRAVENOUS; SUBCUTANEOUS
Refills: 0 | Status: DISCONTINUED | OUTPATIENT
Start: 2019-09-22 | End: 2019-09-23

## 2019-09-22 RX ORDER — CIPROFLOXACIN LACTATE 400MG/40ML
400 VIAL (ML) INTRAVENOUS EVERY 12 HOURS
Refills: 0 | Status: DISCONTINUED | OUTPATIENT
Start: 2019-09-22 | End: 2019-09-22

## 2019-09-22 RX ORDER — CARBIDOPA AND LEVODOPA 25; 100 MG/1; MG/1
1 TABLET ORAL
Qty: 0 | Refills: 0 | DISCHARGE

## 2019-09-22 RX ORDER — MIRTAZAPINE 45 MG/1
15 TABLET, ORALLY DISINTEGRATING ORAL AT BEDTIME
Refills: 0 | Status: DISCONTINUED | OUTPATIENT
Start: 2019-09-22 | End: 2019-09-24

## 2019-09-22 RX ORDER — CARBIDOPA AND LEVODOPA 25; 100 MG/1; MG/1
1 TABLET ORAL
Refills: 0 | Status: DISCONTINUED | OUTPATIENT
Start: 2019-09-22 | End: 2019-09-24

## 2019-09-22 RX ORDER — METRONIDAZOLE 500 MG
500 TABLET ORAL EVERY 8 HOURS
Refills: 0 | Status: DISCONTINUED | OUTPATIENT
Start: 2019-09-22 | End: 2019-09-22

## 2019-09-22 RX ORDER — LACTULOSE 10 G/15ML
10 SOLUTION ORAL
Refills: 0 | Status: DISCONTINUED | OUTPATIENT
Start: 2019-09-22 | End: 2019-09-22

## 2019-09-22 RX ORDER — CHOLECALCIFEROL (VITAMIN D3) 125 MCG
1000 CAPSULE ORAL DAILY
Refills: 0 | Status: DISCONTINUED | OUTPATIENT
Start: 2019-09-22 | End: 2019-09-22

## 2019-09-22 RX ORDER — ACETAMINOPHEN 500 MG
650 TABLET ORAL EVERY 6 HOURS
Refills: 0 | Status: DISCONTINUED | OUTPATIENT
Start: 2019-09-22 | End: 2019-09-23

## 2019-09-22 RX ORDER — HEPARIN SODIUM 5000 [USP'U]/ML
5000 INJECTION INTRAVENOUS; SUBCUTANEOUS EVERY 12 HOURS
Refills: 0 | Status: DISCONTINUED | OUTPATIENT
Start: 2019-09-22 | End: 2019-09-24

## 2019-09-22 RX ORDER — SENNA PLUS 8.6 MG/1
2 TABLET ORAL AT BEDTIME
Refills: 0 | Status: DISCONTINUED | OUTPATIENT
Start: 2019-09-22 | End: 2019-09-24

## 2019-09-22 RX ORDER — INFLUENZA VIRUS VACCINE 15; 15; 15; 15 UG/.5ML; UG/.5ML; UG/.5ML; UG/.5ML
0.5 SUSPENSION INTRAMUSCULAR ONCE
Refills: 0 | Status: DISCONTINUED | OUTPATIENT
Start: 2019-09-22 | End: 2019-09-24

## 2019-09-22 RX ORDER — CHOLECALCIFEROL (VITAMIN D3) 125 MCG
1 CAPSULE ORAL
Qty: 0 | Refills: 0 | DISCHARGE

## 2019-09-22 RX ADMIN — CARBIDOPA AND LEVODOPA 1 TABLET(S): 25; 100 TABLET ORAL at 15:15

## 2019-09-22 RX ADMIN — MIRTAZAPINE 15 MILLIGRAM(S): 45 TABLET, ORALLY DISINTEGRATING ORAL at 20:18

## 2019-09-22 RX ADMIN — CARBIDOPA AND LEVODOPA 1 TABLET(S): 25; 100 TABLET ORAL at 17:54

## 2019-09-22 RX ADMIN — CARBIDOPA AND LEVODOPA 1 TABLET(S): 25; 100 TABLET ORAL at 11:02

## 2019-09-22 RX ADMIN — Medication 100 MILLIGRAM(S): at 05:02

## 2019-09-22 RX ADMIN — CARBIDOPA AND LEVODOPA 1 TABLET(S): 25; 100 TABLET ORAL at 20:15

## 2019-09-22 RX ADMIN — CARBIDOPA AND LEVODOPA 1 TABLET(S): 25; 100 TABLET ORAL at 14:51

## 2019-09-22 RX ADMIN — CARBIDOPA AND LEVODOPA 1 TABLET(S): 25; 100 TABLET ORAL at 08:11

## 2019-09-22 RX ADMIN — Medication 650 MILLIGRAM(S): at 03:11

## 2019-09-22 RX ADMIN — CARBIDOPA AND LEVODOPA 1 TABLET(S): 25; 100 TABLET ORAL at 11:01

## 2019-09-22 RX ADMIN — HEPARIN SODIUM 5000 UNIT(S): 5000 INJECTION INTRAVENOUS; SUBCUTANEOUS at 17:54

## 2019-09-22 RX ADMIN — Medication 1 ENEMA: at 11:59

## 2019-09-22 RX ADMIN — LACTULOSE 10 GRAM(S): 10 SOLUTION ORAL at 05:13

## 2019-09-22 RX ADMIN — SODIUM CHLORIDE 50 MILLILITER(S): 9 INJECTION INTRAMUSCULAR; INTRAVENOUS; SUBCUTANEOUS at 12:00

## 2019-09-22 RX ADMIN — Medication 650 MILLIGRAM(S): at 04:00

## 2019-09-22 NOTE — DISCHARGE NOTE NURSING/CASE MANAGEMENT/SOCIAL WORK - NSDCPNINST_GEN_ALL_CORE
call for follow up appointment  with  primary care  md   diet  meds  activity  as per md   any severe pain nausea  vomiting fevers   constipation  any  problems  call md  call 911 [facility]  EMERGENCY ROOM

## 2019-09-22 NOTE — PROGRESS NOTE ADULT - ASSESSMENT
Pt is a 86 yo F.  arrives  from assisted  living       w/ Parkinson's disease, HLD       p/w constipation for about 6 days   ct  abdomen,  with  stercoral colitis  on laxatives/  enema/ IV FLUIDS    Parkinsons. , on sinemet  dvt  ppx   PT eval Pt is a 86 yo F.  arrives  from assisted  living       w/ Parkinson's disease, HLD       p/w constipation for about 6 days   ct  abdomen,  with  stercoral colitis  on laxatives/  enema/  had a  bowel movement  today    Parkinsons. , on sinemet  dvt  ppx   PT eval pending   spoke with  daughter/    wants  pt  discharged    back  to  [place]  today

## 2019-09-22 NOTE — CHART NOTE - NSCHARTNOTEFT_GEN_A_CORE
Spoke with patient's daughter Surekha Patiño regarding pt's Sinemet dosing and times of medication administration. Pt's daughter gave pt's Neurologist as Dr Gio Rich. She stated  that pt has been prescribed both Sinemet 25/100 IR and Sinemet 25/100 ER 5 times per day. She stated that pt takes Sinemet 8:30 am, 12 pm, 3 pm, 6 pm and 9 pm. I answered all her questions and concerns.   Attending Dr Najera also spoke with pt's daughter at length, re: Update on pt's status and Plan. Spoke with patient's daughter Surekha Patiño regarding pt's Sinemet dosing and times of medication administration. Pt's daughter gave pt's Neurologist as Dr Gio Rich. She stated  that pt has been prescribed both Sinemet 25/100 IR and Sinemet 25/100 ER 5 times per day. She stated that pt takes Sinemet 8:30 am, 12 pm, 3 pm, 6 pm and 9 pm. I answered all her questions and concerns.   Attending Dr Najera also spoke with pt's daughter at length, re: Update on pt's status and Plan.    Append to Note - 2:54 pm.   Informed by  that [facility] will only accept pt after abdominal XR is obtained to verify that  Constipation is resolved. Discussed with Dr Najera.   XR Abdomen therefore ordered.

## 2019-09-23 DIAGNOSIS — G20 PARKINSON'S DISEASE: ICD-10-CM

## 2019-09-23 DIAGNOSIS — Z71.89 OTHER SPECIFIED COUNSELING: ICD-10-CM

## 2019-09-23 LAB — TSH SERPL-MCNC: 4.13 UIU/ML — SIGNIFICANT CHANGE UP (ref 0.27–4.2)

## 2019-09-23 RX ORDER — SOD SULF/SODIUM/NAHCO3/KCL/PEG
250 SOLUTION, RECONSTITUTED, ORAL ORAL ONCE
Refills: 0 | Status: DISCONTINUED | OUTPATIENT
Start: 2019-09-23 | End: 2019-09-23

## 2019-09-23 RX ORDER — SOD SULF/SODIUM/NAHCO3/KCL/PEG
250 SOLUTION, RECONSTITUTED, ORAL ORAL ONCE
Refills: 0 | Status: COMPLETED | OUTPATIENT
Start: 2019-09-23 | End: 2019-09-23

## 2019-09-23 RX ORDER — MULTIVIT WITH MIN/MFOLATE/K2 340-15/3 G
1 POWDER (GRAM) ORAL ONCE
Refills: 0 | Status: COMPLETED | OUTPATIENT
Start: 2019-09-23 | End: 2019-09-23

## 2019-09-23 RX ADMIN — CARBIDOPA AND LEVODOPA 1 TABLET(S): 25; 100 TABLET ORAL at 14:30

## 2019-09-23 RX ADMIN — CARBIDOPA AND LEVODOPA 1 TABLET(S): 25; 100 TABLET ORAL at 11:20

## 2019-09-23 RX ADMIN — CARBIDOPA AND LEVODOPA 1 TABLET(S): 25; 100 TABLET ORAL at 20:01

## 2019-09-23 RX ADMIN — MIRTAZAPINE 15 MILLIGRAM(S): 45 TABLET, ORALLY DISINTEGRATING ORAL at 21:53

## 2019-09-23 RX ADMIN — CARBIDOPA AND LEVODOPA 1 TABLET(S): 25; 100 TABLET ORAL at 17:22

## 2019-09-23 RX ADMIN — Medication 5 MILLIGRAM(S): at 22:30

## 2019-09-23 RX ADMIN — Medication 250 MILLILITER(S): at 12:50

## 2019-09-23 RX ADMIN — Medication 1 BOTTLE: at 20:25

## 2019-09-23 RX ADMIN — CARBIDOPA AND LEVODOPA 1 TABLET(S): 25; 100 TABLET ORAL at 08:53

## 2019-09-23 NOTE — CONSULT NOTE ADULT - SUBJECTIVE AND OBJECTIVE BOX
Chief Complaint:  Patient is a 88y old  Female who presents with a chief complaint of constipation and urinary retention. (23 Sep 2019 08:51)      HPI:Pt is a 86 yo F w/ Parkinson's disease, HLD p/w constipation for about 6 days. Pt reported that she has been unable to "defecate" for about 6 day. Pt reported that on the day of admission she additionally had trouble urinating and had pain in her anus which she described  though she josh as though "i have to go, but can't." Pt reported that she did not experience and fevers, chills, N/V, SOB, CP, abdominal pain, melena, hematochezia. GI consulted. At time of examination, pt denies abdominal pain, n/v. Tolerating Po well. S/p tap water enema and SMOG yesterday with 2 large bms. Repeat AXR yesterday with stool noted throughout the colon.     Allergies:  No Known Allergies      Medications:  bisacodyl 5 milliGRAM(s) Oral at bedtime  carbidopa/levodopa  25/100 1 Tablet(s) Oral <User Schedule>  carbidopa/levodopa CR 25/100 1 Tablet(s) Oral <User Schedule>  heparin  Injectable 5000 Unit(s) SubCutaneous every 12 hours  influenza   Vaccine 0.5 milliLiter(s) IntraMuscular once  mirtazapine 15 milliGRAM(s) Oral at bedtime  polyethylene glycol 3350 17 Gram(s) Oral daily PRN  senna 2 Tablet(s) Oral at bedtime      PMHX/PSHX:  HLD (hyperlipidemia)  Parkinson disease  Parkinsons  No significant past surgical history      Family history:  FH: HTN (hypertension)  No pertinent family history in first degree relatives      Social History: non-toxic habits    ROS:     General:  No wt loss, fevers, chills, night sweats, fatigue,   Eyes:  Good vision, no reported pain  ENT:  No sore throat, pain, runny nose, dysphagia  CV:  No pain, palpitations, hypo/hypertension  Resp:  No dyspnea, cough, tachypnea, wheezing  GI:  No pain, No nausea, No vomiting, No diarrhea, No constipation, No weight loss, No fever, No pruritis, No rectal bleeding, No tarry stools, No dysphagia,  :  No pain, bleeding, incontinence, nocturia  Muscle:  No pain, weakness  Neuro:  No weakness, tingling, memory problems  Psych:  No fatigue, insomnia, mood problems, depression  Endocrine:  No polyuria, polydipsia, cold/heat intolerance  Heme:  No petechiae, ecchymosis, easy bruisability  Skin:  No rash, tattoos, scars, edema      PHYSICAL EXAM:   Vital Signs:  Vital Signs Last 24 Hrs  T(C): 36.3 (23 Sep 2019 11:22), Max: 37 (23 Sep 2019 00:09)  T(F): 97.3 (23 Sep 2019 11:22), Max: 98.6 (23 Sep 2019 00:09)  HR: 89 (23 Sep 2019 11:22) (76 - 91)  BP: 129/86 (23 Sep 2019 11:22) (129/86 - 148/90)  BP(mean): --  RR: 18 (23 Sep 2019 11:22) (18 - 18)  SpO2: 94% (23 Sep 2019 11:22) (94% - 96%)  Daily     Daily     GENERAL:  Appears stated age, well-groomed, well-nourished, no distress  HEENT:  NC/AT,  conjunctivae clear and pink, no thyromegaly, nodules, adenopathy, no JVD, sclera -anicteric  CHEST:  Full & symmetric excursion, no increased effort, breath sounds clear  HEART:  Regular rhythm, S1, S2, no murmur/rub/S3/S4, no abdominal bruit, no edema  ABDOMEN:  Soft, non-tender, non-distended, normoactive bowel sounds,  no masses ,no hepato-splenomegaly, no signs of chronic liver disease  EXTEREMITIES:  no cyanosis,clubbing or edema  SKIN:  No rash/erythema/ecchymoses/petechiae/wounds/abscess/warm/dry  NEURO:  Alert, oriented, no asterixis, no tremor, no encephalopathy    LABS:                        12.3   10.37 )-----------( 319      ( 22 Sep 2019 09:13 )             38.7         143  |  102  |  17  ----------------------------<  91  3.9   |  31  |  0.67    Ca    9.7      22 Sep 2019 07:00  Phos  3.5       Mg     2.0         TPro  5.5<L>  /  Alb  3.5  /  TBili  0.9  /  DBili  x   /  AST  9<L>  /  ALT  <5<L>  /  AlkPhos  88  09-22    LIVER FUNCTIONS - ( 22 Sep 2019 07:00 )  Alb: 3.5 g/dL / Pro: 5.5 g/dL / ALK PHOS: 88 U/L / ALT: <5 U/L / AST: 9 U/L / GGT: x           PT/INR - ( 21 Sep 2019 14:52 )   PT: 12.3 sec;   INR: 1.08 ratio         PTT - ( 21 Sep 2019 14:52 )  PTT:32.3 sec  Urinalysis Basic - ( 21 Sep 2019 15:54 )    Color: Yellow / Appearance: Clear / S.023 / pH: x  Gluc: x / Ketone: Trace  / Bili: Negative / Urobili: Negative   Blood: x / Protein: Trace / Nitrite: Negative   Leuk Esterase: Negative / RBC: 2 /hpf / WBC 1 /HPF   Sq Epi: x / Non Sq Epi: 1 /hpf / Bacteria: Negative          Imaging:

## 2019-09-23 NOTE — CONSULT NOTE ADULT - PROBLEM SELECTOR RECOMMENDATION 9
- stercoral colitis on CT a/p  - s/p tap water enema and smog enema with 2 large bms  - repeat AXR yesterday with stool noted throughout the colon  - MOVIPREP 250 cc x 1 ordered, cw bowel regimen senna 2 tabs at bedtime, Miralax prn daily, dulcolax at bedtime   - monitor for bms  - diet as tolerated - stercoral colitis on CT a/p  - s/p tap water enema and smog enema with 2 large bms  - repeat AXR yesterday with stool noted throughout the colon  - MOVIPREP 250 cc x 1 ordered, 1 bottle mag citrate ordered, cw bowel regimen senna 2 tabs at bedtime, Miralax prn daily, dulcolax at bedtime   - monitor for bms  - diet as tolerated

## 2019-09-23 NOTE — CONSULT NOTE ADULT - ASSESSMENT
88 year old female presents with constipation x 6 days. CT imaging significant for stercoral colitis. GI constipated.

## 2019-09-23 NOTE — PROGRESS NOTE ADULT - ASSESSMENT
Pt is a 88 yo F.  arrives  from assisted  living       w/ Parkinson's disease, HLD       p/w constipation for about 6 days   ct  abdomen,  with  stercoral colitis  on laxatives/  enema/  had a  bowel movement s    Parkinsons. , on sinemet  dvt  ppx   PT eval pending   spoke with  daughter/    wanted   pt  discharged    back  to  [place]   on sunday   however facility will not willing to  accept pt, unless pt has an abd  xray   axr done.  awaiting  result   plan,  d/c today to  assisted   living Pt is a 88 yo F.  arrives  from assisted  living       w/ Parkinson's disease, HLD       p/w constipation for about 6 days   ct  abdomen,  with  stercoral colitis  on laxatives/  enema/  had a  bowel movement s    Parkinsons. , on sinemet  dvt  ppx   PT eval pending   spoke with  daughter/    wanted   pt  discharged    back  to  [place]   on sunday   however facility will not willing to  accept pt, unless pt has an abd  xray   axr done.   stool  gi  eval/  golytely

## 2019-09-24 ENCOUNTER — TRANSCRIPTION ENCOUNTER (OUTPATIENT)
Age: 84
End: 2019-09-24

## 2019-09-24 VITALS
HEART RATE: 82 BPM | OXYGEN SATURATION: 96 % | SYSTOLIC BLOOD PRESSURE: 139 MMHG | RESPIRATION RATE: 18 BRPM | DIASTOLIC BLOOD PRESSURE: 90 MMHG | TEMPERATURE: 98 F

## 2019-09-24 PROCEDURE — 74018 RADEX ABDOMEN 1 VIEW: CPT | Mod: 26

## 2019-09-24 RX ORDER — MIRTAZAPINE 45 MG/1
1 TABLET, ORALLY DISINTEGRATING ORAL
Qty: 0 | Refills: 0 | DISCHARGE

## 2019-09-24 RX ORDER — MIRTAZAPINE 45 MG/1
1 TABLET, ORALLY DISINTEGRATING ORAL
Qty: 0 | Refills: 0 | DISCHARGE
Start: 2019-09-24

## 2019-09-24 RX ORDER — SENNA PLUS 8.6 MG/1
2 TABLET ORAL
Qty: 60 | Refills: 0
Start: 2019-09-24 | End: 2019-10-23

## 2019-09-24 RX ORDER — POLYETHYLENE GLYCOL 3350 17 G/17G
17 POWDER, FOR SOLUTION ORAL
Qty: 0 | Refills: 0 | DISCHARGE
Start: 2019-09-24

## 2019-09-24 RX ORDER — SENNA PLUS 8.6 MG/1
1 TABLET ORAL
Qty: 0 | Refills: 0 | DISCHARGE

## 2019-09-24 RX ADMIN — CARBIDOPA AND LEVODOPA 1 TABLET(S): 25; 100 TABLET ORAL at 08:22

## 2019-09-24 RX ADMIN — CARBIDOPA AND LEVODOPA 1 TABLET(S): 25; 100 TABLET ORAL at 13:56

## 2019-09-24 RX ADMIN — CARBIDOPA AND LEVODOPA 1 TABLET(S): 25; 100 TABLET ORAL at 11:06

## 2019-09-24 RX ADMIN — HEPARIN SODIUM 5000 UNIT(S): 5000 INJECTION INTRAVENOUS; SUBCUTANEOUS at 05:40

## 2019-09-24 NOTE — PROGRESS NOTE ADULT - ASSESSMENT
Pt is a 88 yo F.  arrives  from assisted  living       w/ Parkinson's disease, HLD       p/w constipation for about 6 days   ct  abdomen,  with  stercoral colitis  on laxatives/  enema/  had a  bowel movement s    Parkinsons. , on sinemet  PT eval    spoke with  daughter/    wanted   pt  discharged    back  to  [place]   on sunday   however facility will not willing to  accept pt, unless pt has an abd  xray   axr done.   stool  gi  eval/   s/p moviprep/  mag citrate   axr today  and  hopefully,  facility will  accept pt

## 2019-09-24 NOTE — PROGRESS NOTE ADULT - PROBLEM SELECTOR PLAN 1
- stercoral colitis on CT a/p  - s/p tap water enema and smog enema with 2 large bms  - MOVIPREP 250 cc x 1 ordered, 1 bottle mag citrate ordered, cw bowel regimen senna 2 tabs at bedtime, Miralax prn daily, dulcolax at bedtime   - repeat AXR done  - cleared for dc   - monitor for bms  - diet as tolerated

## 2019-09-24 NOTE — DISCHARGE NOTE PROVIDER - PROVIDER TOKENS
FREE:[LAST:[Herlinda],FIRST:[Aislinn],PHONE:[(   )    -],FAX:[(   )    -],ADDRESS:[Primary care doctor]]

## 2019-09-24 NOTE — PROGRESS NOTE ADULT - REASON FOR ADMISSION
constipation and urinary retention.

## 2019-09-24 NOTE — DISCHARGE NOTE PROVIDER - HOSPITAL COURSE
Pt is a 86 yo F.  arrives  from assisted  living         w/ Parkinson's disease, HLD         p/w constipation for about 6 days     - stercoral colitis on CT a/p    - s/p tap water enema and smog enema with 2 large bms    - MOVIPREP 250 cc x 1 ordered, 1 bottle mag citrate ordered, cw bowel regimen senna 2 tabs at bedtime, Miralax prn daily, dulcolax at bedtime     - repeat AXR done    - cleared for dc     - monitor for bms    - diet as tolerated.

## 2019-09-24 NOTE — PROGRESS NOTE ADULT - SUBJECTIVE AND OBJECTIVE BOX
had   bowel movements    REVIEW OF SYSTEMS:  GEN: no fever,    no chills  RESP: no SOB,   no cough  CVS: no chest pain,   no palpitations  GI: no abdominal pain,   no nausea,   no vomiting,   no constipation,   no diarrhea  : no dysuria,   no frequency  NEURO: no headache,   no dizziness  PSYCH: no depression,   not anxious  Derm : no rash    MEDICATIONS  (STANDING):  bisacodyl 5 milliGRAM(s) Oral at bedtime  carbidopa/levodopa  25/100 1 Tablet(s) Oral <User Schedule>  carbidopa/levodopa CR 25/100 1 Tablet(s) Oral <User Schedule>  heparin  Injectable 5000 Unit(s) SubCutaneous every 12 hours  influenza   Vaccine 0.5 milliLiter(s) IntraMuscular once  mirtazapine 15 milliGRAM(s) Oral at bedtime  senna 2 Tablet(s) Oral at bedtime    MEDICATIONS  (PRN):  polyethylene glycol 3350 17 Gram(s) Oral daily PRN Constipation      Vital Signs Last 24 Hrs  T(C): 36.7 (24 Sep 2019 04:49), Max: 37 (23 Sep 2019 20:00)  T(F): 98 (24 Sep 2019 04:49), Max: 98.6 (23 Sep 2019 20:00)  HR: 80 (24 Sep 2019 04:49) (80 - 89)  BP: 148/84 (24 Sep 2019 04:49) (110/77 - 148/84)  BP(mean): --  RR: 16 (24 Sep 2019 04:49) (16 - 18)  SpO2: 94% (24 Sep 2019 04:49) (94% - 95%)  CAPILLARY BLOOD GLUCOSE        I&O's Summary    23 Sep 2019 07:01  -  24 Sep 2019 07:00  --------------------------------------------------------  IN: 480 mL / OUT: 0 mL / NET: 480 mL        PHYSICAL EXAM:  HEAD:  Atraumatic, Normocephalic  NECK: Supple, No   JVD  CHEST/LUNG:   no     rales,     no,    rhonchi  HEART: Regular rate and rhythm;         murmur  ABDOMEN: Soft, Nontender, ;   EXTREMITIES:      no  edema  NEUROLOGY:  alert    LABS:                        12.3   10.37 )-----------( 319      ( 22 Sep 2019 09:13 )             38.7                           Thyroid Stimulating Hormone, Serum: 4.13 uIU/mL (09-23 @ 08:39)          Consultant(s) Notes Reviewed:      Care Discussed with Consultants/Other Providers:
INTERVAL HPI/OVERNIGHT EVENTS:    pt seen and examined  s/p another large bm today  repeat AXR without stool burden  pt is without complaints     MEDICATIONS  (STANDING):  bisacodyl 5 milliGRAM(s) Oral at bedtime  carbidopa/levodopa  25/100 1 Tablet(s) Oral <User Schedule>  carbidopa/levodopa CR 25/100 1 Tablet(s) Oral <User Schedule>  heparin  Injectable 5000 Unit(s) SubCutaneous every 12 hours  influenza   Vaccine 0.5 milliLiter(s) IntraMuscular once  mirtazapine 15 milliGRAM(s) Oral at bedtime  senna 2 Tablet(s) Oral at bedtime    MEDICATIONS  (PRN):  polyethylene glycol 3350 17 Gram(s) Oral daily PRN Constipation      Allergies    No Known Allergies    Intolerances        Review of Systems:    General:  No wt loss, fevers, chills, night sweats,fatigue,   Eyes:  Good vision, no reported pain  ENT:  No sore throat, pain, runny nose, dysphagia  CV:  No pain, palpitations, hypo/hypertension  Resp:  No dyspnea, cough, tachypnea, wheezing  GI:  No pain, No nausea, No vomiting, No diarrhea, No constipation, No weight loss, No fever, No pruritis, No rectal bleeding, No melena, No dysphagia  :  No pain, bleeding, incontinence, nocturia  Muscle:  No pain, weakness  Neuro:  No weakness, tingling, memory problems  Psych:  No fatigue, insomnia, mood problems, depression  Endocrine:  No polyuria, polydypsia, cold/heat intolerance  Heme:  No petechiae, ecchymosis, easy bruisability  Skin:  No rash, tattoos, scars, edema      Vital Signs Last 24 Hrs  T(C): 36.7 (24 Sep 2019 04:49), Max: 37 (23 Sep 2019 20:00)  T(F): 98 (24 Sep 2019 04:49), Max: 98.6 (23 Sep 2019 20:00)  HR: 80 (24 Sep 2019 04:49) (80 - 84)  BP: 148/84 (24 Sep 2019 04:49) (110/77 - 148/84)  BP(mean): --  RR: 16 (24 Sep 2019 04:49) (16 - 18)  SpO2: 94% (24 Sep 2019 04:49) (94% - 95%)    PHYSICAL EXAM:    Constitutional: NAD, well-developed  HEENT: EOMI, throat clear  Neck: No LAD, supple  Respiratory: CTA and P  Cardiovascular: S1 and S2, RRR, no M  Gastrointestinal: BS+, soft, NT/ND, neg HSM,  Extremities: No peripheral edema, neg clubing, cyanosis  Vascular: 2+ peripheral pulses  Neurological: A/O x 3, no focal deficits  Psychiatric: Normal mood, normal affect  Skin: No rashes      LABS:                RADIOLOGY & ADDITIONAL TESTS:
no cp/sob    REVIEW OF SYSTEMS:  GEN: no fever,    no chills  RESP: no SOB,   no cough  CVS: no chest pain,   no palpitations  GI: no abdominal pain,   no nausea,   no vomiting,   no constipation,   no diarrhea  : no dysuria,   no frequency  NEURO: no headache,   no dizziness  PSYCH: no depression,   not anxious  Derm : no rash    MEDICATIONS  (STANDING):  carbidopa/levodopa  25/100 1 Tablet(s) Oral <User Schedule>  carbidopa/levodopa CR 25/100 1 Tablet(s) Oral <User Schedule>  docusate sodium 100 milliGRAM(s) Oral three times a day  influenza   Vaccine 0.5 milliLiter(s) IntraMuscular once  lactulose Syrup 10 Gram(s) Oral two times a day  mirtazapine 15 milliGRAM(s) Oral at bedtime    MEDICATIONS  (PRN):  acetaminophen   Tablet .. 650 milliGRAM(s) Oral every 6 hours PRN Severe Pain (7 - 10)  senna 2 Tablet(s) Oral at bedtime PRN Constipation      Vital Signs Last 24 Hrs  T(C): 36.5 (22 Sep 2019 04:56), Max: 36.6 (21 Sep 2019 14:01)  T(F): 97.7 (22 Sep 2019 04:56), Max: 97.9 (21 Sep 2019 14:01)  HR: 81 (22 Sep 2019 04:56) (69 - 87)  BP: 144/85 (22 Sep 2019 04:56) (118/84 - 150/118)  BP(mean): --  RR: 17 (22 Sep 2019 04:56) (16 - 18)  SpO2: 97% (22 Sep 2019 04:56) (95% - 99%)  CAPILLARY BLOOD GLUCOSE        I&O's Summary    21 Sep 2019 07:01  -  22 Sep 2019 07:00  --------------------------------------------------------  IN: 180 mL / OUT: 0 mL / NET: 180 mL        PHYSICAL EXAM:  HEAD:  Atraumatic, Normocephalic  NECK: Supple, No   JVD  CHEST/LUNG:   no     rales,     no,    rhonchi  HEART: Regular rate and rhythm;         murmur  ABDOMEN: Soft, Nontender, ;   EXTREMITIES:    no    edema  NEUROLOGY:  alert    LABS:                        12.3   10.37 )-----------( 319      ( 22 Sep 2019 09:13 )             38.7         143  |  102  |  17  ----------------------------<  91  3.9   |  31  |  0.67    Ca    9.7      22 Sep 2019 07:00  Phos  3.5       Mg     2.0         TPro  5.5<L>  /  Alb  3.5  /  TBili  0.9  /  DBili  x   /  AST  9<L>  /  ALT  <5<L>  /  AlkPhos  88      PT/INR - ( 21 Sep 2019 14:52 )   PT: 12.3 sec;   INR: 1.08 ratio         PTT - ( 21 Sep 2019 14:52 )  PTT:32.3 sec      Urinalysis Basic - ( 21 Sep 2019 15:54 )    Color: Yellow / Appearance: Clear / S.023 / pH: x  Gluc: x / Ketone: Trace  / Bili: Negative / Urobili: Negative   Blood: x / Protein: Trace / Nitrite: Negative   Leuk Esterase: Negative / RBC: 2 /hpf / WBC 1 /HPF   Sq Epi: x / Non Sq Epi: 1 /hpf / Bacteria: Negative      Lactate, Blood: 1.2 mmol/L ( @ 06:58)                  Consultant(s) Notes Reviewed:      Care Discussed with Consultants/Other Providers:
resting  in bed   had  bowel movements    REVIEW OF SYSTEMS:  GEN: no fever,    no chills  RESP: no SOB,   no cough  CVS: no chest pain,   no palpitations  GI: no abdominal pain,   no nausea,   no vomiting,   no constipation,   no diarrhea  : no dysuria,   no frequency  NEURO: no headache,   no dizziness  PSYCH: no depression,   not anxious  Derm : no rash    MEDICATIONS  (STANDING):  bisacodyl 5 milliGRAM(s) Oral at bedtime  carbidopa/levodopa  25/100 1 Tablet(s) Oral <User Schedule>  carbidopa/levodopa CR 25/100 1 Tablet(s) Oral <User Schedule>  heparin  Injectable 5000 Unit(s) SubCutaneous every 12 hours  influenza   Vaccine 0.5 milliLiter(s) IntraMuscular once  mirtazapine 15 milliGRAM(s) Oral at bedtime  senna 2 Tablet(s) Oral at bedtime  sodium chloride 0.9%. 1000 milliLiter(s) (50 mL/Hr) IV Continuous <Continuous>    MEDICATIONS  (PRN):  acetaminophen   Tablet .. 650 milliGRAM(s) Oral every 6 hours PRN Severe Pain (7 - 10)  polyethylene glycol 3350 17 Gram(s) Oral daily PRN Constipation      Vital Signs Last 24 Hrs  T(C): 36.6 (23 Sep 2019 05:43), Max: 37 (22 Sep 2019 12:14)  T(F): 97.8 (23 Sep 2019 05:43), Max: 98.6 (22 Sep 2019 12:14)  HR: 91 (23 Sep 2019 05:43) (76 - 91)  BP: 148/90 (23 Sep 2019 05:43) (140/88 - 150/88)  BP(mean): --  RR: 18 (23 Sep 2019 05:43) (16 - 18)  SpO2: 94% (23 Sep 2019 05:43) (94% - 96%)  CAPILLARY BLOOD GLUCOSE        I&O's Summary    22 Sep 2019 07:01  -  23 Sep 2019 07:00  --------------------------------------------------------  IN: 742 mL / OUT: 0 mL / NET: 742 mL        PHYSICAL EXAM:  HEAD:  Atraumatic, Normocephalic  NECK: Supple, No   JVD  CHEST/LUNG:   no     rales,     no,    rhonchi  HEART: Regular rate and rhythm;         murmur  ABDOMEN: Soft, Nontender, ;   EXTREMITIES:    no    edema  NEUROLOGY:  alert    LABS:                        12.3   10.37 )-----------( 319      ( 22 Sep 2019 09:13 )             38.7         143  |  102  |  17  ----------------------------<  91  3.9   |  31  |  0.67    Ca    9.7      22 Sep 2019 07:00  Phos  3.5       Mg     2.0         TPro  5.5<L>  /  Alb  3.5  /  TBili  0.9  /  DBili  x   /  AST  9<L>  /  ALT  <5<L>  /  AlkPhos  88      PT/INR - ( 21 Sep 2019 14:52 )   PT: 12.3 sec;   INR: 1.08 ratio         PTT - ( 21 Sep 2019 14:52 )  PTT:32.3 sec      Urinalysis Basic - ( 21 Sep 2019 15:54 )    Color: Yellow / Appearance: Clear / S.023 / pH: x  Gluc: x / Ketone: Trace  / Bili: Negative / Urobili: Negative   Blood: x / Protein: Trace / Nitrite: Negative   Leuk Esterase: Negative / RBC: 2 /hpf / WBC 1 /HPF   Sq Epi: x / Non Sq Epi: 1 /hpf / Bacteria: Negative      Lactate, Blood: 1.2 mmol/L ( @ 06:58)                  Consultant(s) Notes Reviewed:      Care Discussed with Consultants/Other Providers:

## 2019-09-24 NOTE — PHYSICAL THERAPY INITIAL EVALUATION ADULT - PERTINENT HX OF CURRENT PROBLEM, REHAB EVAL
Pt is a 86 yo F w/ Parkinson's disease, HLD p/w constipation for about 6 days. Pt reported that she has been unable to "defecate" for about 6 day. Pt reported that on the day of admission she additionally had trouble urinating and had pain in her anus which she described  though she josh as though "i have to go, but can't." Pt reported that she did not experience and fevers, chills, N/V, SOB, CP, abdominal pain, melena, hematochezia. Pt is a 86 yo F w/ Parkinson's disease, HLD p/w constipation for about 6 days. Pt reported that she has been unable to "defecate" for about 6 day. Pt reported that on the day of admission she additionally had trouble urinating and had pain in her anus which she described  though she josh as though "i have to go, but can't." Pt reported that she did not experience and fevers, chills, N/V, SOB, CP, abdominal pain, melena, hematochezia.  CT imaging significant for stercoral colitis.

## 2019-09-24 NOTE — PHYSICAL THERAPY INITIAL EVALUATION ADULT - IMPAIRMENTS CONTRIBUTING TO GAIT DEVIATIONS, PT EVAL
decreased strength/narrow base of support/Pt ambulated with narrow SANDRA, mildly unsteady gait, 0 LOB while ambulating./impaired balance

## 2019-09-24 NOTE — PHYSICAL THERAPY INITIAL EVALUATION ADULT - DISCHARGE DISPOSITION, PT EVAL
[id number] assist with all functional activities upon d/c- message on daughter's voicemail-awaiting callback/home w/ home PT

## 2019-09-24 NOTE — DISCHARGE NOTE PROVIDER - NSDCCPCAREPLAN_GEN_ALL_CORE_FT
PRINCIPAL DISCHARGE DIAGNOSIS  Diagnosis: Colitis  Assessment and Plan of Treatment: Stercoral colitis- continue with bowel regimen      SECONDARY DISCHARGE DIAGNOSES  Diagnosis: Parkinsons  Assessment and Plan of Treatment: Continue with Sinemet

## 2019-10-01 PROCEDURE — 83735 ASSAY OF MAGNESIUM: CPT

## 2019-10-01 PROCEDURE — 84443 ASSAY THYROID STIM HORMONE: CPT

## 2019-10-01 PROCEDURE — 36415 COLL VENOUS BLD VENIPUNCTURE: CPT

## 2019-10-01 PROCEDURE — 74177 CT ABD & PELVIS W/CONTRAST: CPT

## 2019-10-01 PROCEDURE — 84100 ASSAY OF PHOSPHORUS: CPT

## 2019-10-01 PROCEDURE — 74018 RADEX ABDOMEN 1 VIEW: CPT

## 2019-10-01 PROCEDURE — 85027 COMPLETE CBC AUTOMATED: CPT

## 2019-10-01 PROCEDURE — 99285 EMERGENCY DEPT VISIT HI MDM: CPT

## 2019-10-01 PROCEDURE — 85730 THROMBOPLASTIN TIME PARTIAL: CPT

## 2019-10-01 PROCEDURE — 97162 PT EVAL MOD COMPLEX 30 MIN: CPT

## 2019-10-01 PROCEDURE — 87086 URINE CULTURE/COLONY COUNT: CPT

## 2019-10-01 PROCEDURE — 80053 COMPREHEN METABOLIC PANEL: CPT

## 2019-10-01 PROCEDURE — 83605 ASSAY OF LACTIC ACID: CPT

## 2019-10-01 PROCEDURE — 85610 PROTHROMBIN TIME: CPT

## 2019-10-01 PROCEDURE — 81001 URINALYSIS AUTO W/SCOPE: CPT

## 2019-11-09 ENCOUNTER — EMERGENCY (EMERGENCY)
Facility: HOSPITAL | Age: 84
LOS: 1 days | Discharge: HOME | End: 2019-11-09
Attending: EMERGENCY MEDICINE | Admitting: EMERGENCY MEDICINE
Payer: MEDICARE

## 2019-11-09 VITALS
HEIGHT: 62 IN | DIASTOLIC BLOOD PRESSURE: 71 MMHG | HEART RATE: 82 BPM | WEIGHT: 110.01 LBS | OXYGEN SATURATION: 97 % | RESPIRATION RATE: 18 BRPM | TEMPERATURE: 98 F | SYSTOLIC BLOOD PRESSURE: 103 MMHG

## 2019-11-09 VITALS
RESPIRATION RATE: 16 BRPM | DIASTOLIC BLOOD PRESSURE: 72 MMHG | HEART RATE: 69 BPM | TEMPERATURE: 98 F | OXYGEN SATURATION: 99 % | SYSTOLIC BLOOD PRESSURE: 106 MMHG

## 2019-11-09 PROCEDURE — 74176 CT ABD & PELVIS W/O CONTRAST: CPT | Mod: 26

## 2019-11-09 PROCEDURE — 99284 EMERGENCY DEPT VISIT MOD MDM: CPT | Mod: 25

## 2019-11-09 PROCEDURE — 74019 RADEX ABDOMEN 2 VIEWS: CPT

## 2019-11-09 PROCEDURE — 99284 EMERGENCY DEPT VISIT MOD MDM: CPT

## 2019-11-09 PROCEDURE — 74019 RADEX ABDOMEN 2 VIEWS: CPT | Mod: 26

## 2019-11-09 PROCEDURE — 74176 CT ABD & PELVIS W/O CONTRAST: CPT

## 2019-11-09 RX ORDER — MULTIVIT WITH MIN/MFOLATE/K2 340-15/3 G
1 POWDER (GRAM) ORAL ONCE
Refills: 0 | Status: COMPLETED | OUTPATIENT
Start: 2019-11-09 | End: 2019-11-09

## 2019-11-09 RX ORDER — CARBIDOPA AND LEVODOPA 25; 100 MG/1; MG/1
1 TABLET ORAL
Qty: 0 | Refills: 0 | DISCHARGE

## 2019-11-09 RX ADMIN — Medication 1 ENEMA: at 14:57

## 2019-11-09 RX ADMIN — Medication 1 BOTTLE: at 14:39

## 2019-11-09 NOTE — ED ADULT NURSE NOTE - OBJECTIVE STATEMENT
89 y/o female from the [place] with pmh of parkinsons c/o constipation since sunday. she states this happens to her often and was admitted to [facility] with constipation a few weeks ago. she other wise has no complaints. no nausea vomiting fever chills chest pain sob headache and dysuria. 4/10 abdominal discomfort

## 2019-11-09 NOTE — ED PROVIDER NOTE - OBJECTIVE STATEMENT
87 yo F p/w con constipation x past several days, feels like she cannot have a BM. NO abd pain. no n/v. no cp/sob/palp. no fever/chills. no weakness. Nl po intake. no agg/allev factors. no trauma/ illness. no other inj or co.

## 2019-11-09 NOTE — ED PROVIDER NOTE - CHPI ED SYMPTOMS NEG
no diarrhea/no vomiting/no blood in stool/no fever/no hematuria/no abdominal distension/no dysuria/no burning urination/no chills

## 2019-11-09 NOTE — ED PROVIDER NOTE - PROGRESS NOTE DETAILS
Performed digital fecal disimpaction with removal of moderate stool. Pt adriano well, minimal bleeding. Pt doing well , no acute co or changes.

## 2019-11-09 NOTE — ED PROVIDER NOTE - PSYCHIATRIC, MLM
Consent was obtained and risks were reviewed including but not limited to scarring, infection, bleeding, scabbing, incomplete removal, nerve damage and allergy to anesthesia. Bill For Surgical Tray: no Was A Bandage Applied: Yes Curettage Text: The wound bed was treated with curettage after the biopsy was performed. Anesthesia Type: 1% lidocaine with 1:100,000 epinephrine Notification Instructions: Patient will be notified of biopsy results. However, patient instructed to call the office if not contacted within 2 weeks. Lab: [id number] Anesthesia Volume In Cc (Will Not Render If 0): 0.4 Detail Level: Detailed Electrodesiccation And Curettage Text: The wound bed was treated with electrodesiccation and curettage after the biopsy was performed. Size Of Lesion In Cm: 0.7 Billing Type: Third-Party Bill Biopsy Type: H and E Cryotherapy Text: The wound bed was treated with cryotherapy after the biopsy was performed. Additional Anesthesia Volume In Cc (Will Not Render If 0): 0 Post-Care Instructions: I reviewed with the patient in detail post-care instructions. Patient is to keep the biopsy site dry overnight, and then apply bacitracin twice daily until healed. Patient may apply hydrogen peroxide soaks to remove any crusting. Electrodesiccation Text: The wound bed was treated with electrodesiccation after the biopsy was performed. Dressing: no dressing applied Silver Nitrate Text: The wound bed was treated with silver nitrate after the biopsy was performed. Hemostasis: Drysol Biopsy Method: 15 blade Depth Of Biopsy: dermis Type Of Destruction Used: Curettage Wound Care: Vaseline Alert and oriented to person, place, time/situation. normal mood and affect. no apparent risk to self or others.

## 2019-11-09 NOTE — ED ADULT NURSE REASSESSMENT NOTE - NS ED NURSE REASSESS COMMENT FT1
daughter here to  patient, small amount of stool noted in diaper, feels better, no other complaints, wheelchair to car with transporter

## 2019-11-09 NOTE — ED PROVIDER NOTE - PATIENT PORTAL LINK FT
You can access the FollowMyHealth Patient Portal offered by [facility] by registering at the following website: [email or web address]. By joining [employer]’s FollowMyHealth portal, you will also be able to view your health information using other applications (apps) compatible with our system.

## 2019-11-09 NOTE — ED PROVIDER NOTE - CARE PROVIDER_API CALL
Ike Leija ()  Internal Medicine  [address] [place] [address]  Phone: [phone]  Fax: [phone]  Follow Up Time:

## 2019-11-09 NOTE — ED PROVIDER NOTE - NSFOLLOWUPINSTRUCTIONS_ED_ALL_ED_FT
1) Follow-up with your Primary Medical Doctor. Call today / next business day for prompt follow-up.  2) Return to Emergency room for any worsening or persistent pain, weakness, fever, vomiting, diarrhea, unable to eat / drink, weak or dizzy, or any other concerning symptoms.  3) See attached instruction sheets for additional information, including information regarding signs and symptoms to look out for, reasons to seek immediate care and other important instructions.  4) Plenty of fluids 1) Follow-up with your Primary Medical Doctor. Call today / next business day for prompt follow-up.  2) Return to Emergency room for any worsening or persistent pain, weakness, fever, vomiting, diarrhea, unable to eat / drink, weak or dizzy, or any other concerning symptoms.  3) See attached instruction sheets for additional information, including information regarding signs and symptoms to look out for, reasons to seek immediate care and other important instructions.  4) Plenty of fluids  5) Follow-up with [facility], call Monday for prompt follow-up

## 2020-01-03 NOTE — ED PROVIDER NOTE - CPE EDP RESP NORM
as per mom pt started coughing, fever T103 since x2 days, last night started vomited x 2. last Tylenol home at 6 pm but vomited. normal...

## 2020-01-08 ENCOUNTER — EMERGENCY (EMERGENCY)
Facility: HOSPITAL | Age: 85
LOS: 1 days | Discharge: HOME | End: 2020-01-08
Attending: EMERGENCY MEDICINE | Admitting: EMERGENCY MEDICINE
Payer: MEDICARE

## 2020-01-08 VITALS
OXYGEN SATURATION: 98 % | SYSTOLIC BLOOD PRESSURE: 154 MMHG | HEART RATE: 89 BPM | DIASTOLIC BLOOD PRESSURE: 94 MMHG | RESPIRATION RATE: 20 BRPM | TEMPERATURE: 98 F

## 2020-01-08 VITALS
OXYGEN SATURATION: 96 % | TEMPERATURE: 98 F | DIASTOLIC BLOOD PRESSURE: 60 MMHG | HEART RATE: 68 BPM | RESPIRATION RATE: 47 BRPM | SYSTOLIC BLOOD PRESSURE: 120 MMHG

## 2020-01-08 PROCEDURE — 74019 RADEX ABDOMEN 2 VIEWS: CPT | Mod: 26

## 2020-01-08 PROCEDURE — 99283 EMERGENCY DEPT VISIT LOW MDM: CPT | Mod: 25

## 2020-01-08 PROCEDURE — 99283 EMERGENCY DEPT VISIT LOW MDM: CPT

## 2020-01-08 PROCEDURE — 74019 RADEX ABDOMEN 2 VIEWS: CPT

## 2020-01-08 RX ORDER — MULTIVIT WITH MIN/MFOLATE/K2 340-15/3 G
1 POWDER (GRAM) ORAL ONCE
Refills: 0 | Status: COMPLETED | OUTPATIENT
Start: 2020-01-08 | End: 2020-01-08

## 2020-01-08 RX ORDER — CARBIDOPA AND LEVODOPA 25; 100 MG/1; MG/1
1 TABLET ORAL ONCE
Refills: 0 | Status: COMPLETED | OUTPATIENT
Start: 2020-01-08 | End: 2020-01-08

## 2020-01-08 RX ADMIN — Medication 1 ENEMA: at 16:58

## 2020-01-08 RX ADMIN — CARBIDOPA AND LEVODOPA 1 TABLET(S): 25; 100 TABLET ORAL at 16:03

## 2020-01-08 RX ADMIN — Medication 1 BOTTLE: at 15:40

## 2020-01-08 NOTE — ED PROVIDER NOTE - PATIENT PORTAL LINK FT
You can access the FollowMyHealth Patient Portal offered by [facility] by registering at the following website: [email or web address]. By joining [facility]’s FollowMyHealth portal, you will also be able to view your health information using other applications (apps) compatible with our system.

## 2020-01-08 NOTE — ED PROVIDER NOTE - NSFOLLOWUPINSTRUCTIONS_ED_ALL_ED_FT
Continue with home medications for constipation. Follow up with your primary care physician within the next 2-3 days. Return to ED immediately for new or worsening symptoms.

## 2020-01-08 NOTE — ED PROVIDER NOTE - PROGRESS NOTE DETAILS
Pt manually disimpacted. Pt feels better. Abd soft nd nt. Pt tolerating po. Pt to be d/c'ed home on previously prescribed meds and advised f/u with PCP. No emergent concerns. Pt stable for DC home.

## 2020-01-08 NOTE — ED PROVIDER NOTE - CLINICAL SUMMARY MEDICAL DECISION MAKING FREE TEXT BOX
87 yo F p/w constipation----> XR abd flat/upright- full of stool; pt manually disimpacted by PA; pt given 1 bottle mag citrate and 1 enema; pt to f/u with PCP

## 2020-01-08 NOTE — ED PROVIDER NOTE - OBJECTIVE STATEMENT
89 yo F PMHx Parkinson's disease, HLD presents to ED c/o constipation x 5 days. No BM. Pt c/o rectal pain only. Denies abd pain, N/V, fever/chills.

## 2020-01-08 NOTE — ED PROVIDER NOTE - ATTENDING CONTRIBUTION TO CARE
89 yo white female with hard stool and constipation w/o fever, chills, nausea or vomiting and no abdominal pain. Exam revealed a frail elderly female in NAD with benign and non tender abdomen. I agree with plan and management outlined by PA.

## 2020-01-08 NOTE — ED ADULT NURSE NOTE - OBJECTIVE STATEMENT
Pt presents to the Ed via ambulance s/p constipation x 5 days. Pt presents to the Ed via ambulance s/p constipation x 5 days. Pt is c/o rectal pressure.

## 2024-01-01 NOTE — ED ADULT NURSE NOTE - NSIMPLEMENTINTERV_GEN_ALL_ED
no
Implemented All Universal Safety Interventions:  [place] to call system. Call bell, personal items and telephone within reach. Instruct patient to call for assistance. Room bathroom lighting operational. Non-slip footwear when patient is off stretcher. Physically safe environment: no spills, clutter or unnecessary equipment. Stretcher in lowest position, wheels locked, appropriate side rails in place.

## 2024-03-08 NOTE — ED PROVIDER NOTE - GASTROINTESTINAL, MLM
clothing
Abdomen soft, non-tender, no guarding. non-distended. no hsm. no cvat. Pos nl rectum, non-tender, pos FOS. no bleeding.

## 2025-07-18 NOTE — PATIENT PROFILE ADULT - NSPROEDAABILITYLEARN_GEN_A_NUR
Left message to confirm the home sleep study,also gave the number to my supervisor incase I'm with patients.   physical limitations